# Patient Record
Sex: MALE | Race: WHITE | NOT HISPANIC OR LATINO | URBAN - METROPOLITAN AREA
[De-identification: names, ages, dates, MRNs, and addresses within clinical notes are randomized per-mention and may not be internally consistent; named-entity substitution may affect disease eponyms.]

---
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
## 2023-04-02 ENCOUNTER — INPATIENT (INPATIENT)
Facility: HOSPITAL | Age: 18
LOS: 1 days | Discharge: HOME | End: 2023-04-04
Attending: STUDENT IN AN ORGANIZED HEALTH CARE EDUCATION/TRAINING PROGRAM | Admitting: STUDENT IN AN ORGANIZED HEALTH CARE EDUCATION/TRAINING PROGRAM
Payer: COMMERCIAL

## 2023-04-02 VITALS
OXYGEN SATURATION: 100 % | TEMPERATURE: 98 F | DIASTOLIC BLOOD PRESSURE: 66 MMHG | SYSTOLIC BLOOD PRESSURE: 133 MMHG | RESPIRATION RATE: 18 BRPM | HEART RATE: 96 BPM

## 2023-04-02 LAB
FLUAV AG NPH QL: SIGNIFICANT CHANGE UP
FLUBV AG NPH QL: SIGNIFICANT CHANGE UP
RSV RNA NPH QL NAA+NON-PROBE: SIGNIFICANT CHANGE UP
SARS-COV-2 RNA SPEC QL NAA+PROBE: SIGNIFICANT CHANGE UP

## 2023-04-02 PROCEDURE — 99223 1ST HOSP IP/OBS HIGH 75: CPT

## 2023-04-02 RX ORDER — KETOROLAC TROMETHAMINE 30 MG/ML
15 SYRINGE (ML) INJECTION EVERY 6 HOURS
Refills: 0 | Status: DISCONTINUED | OUTPATIENT
Start: 2023-04-02 | End: 2023-04-03

## 2023-04-02 RX ORDER — OXYCODONE AND ACETAMINOPHEN 5; 325 MG/1; MG/1
1 TABLET ORAL EVERY 6 HOURS
Refills: 0 | Status: DISCONTINUED | OUTPATIENT
Start: 2023-04-02 | End: 2023-04-03

## 2023-04-02 RX ORDER — DIAZEPAM 5 MG
2 TABLET ORAL ONCE
Refills: 0 | Status: DISCONTINUED | OUTPATIENT
Start: 2023-04-02 | End: 2023-04-02

## 2023-04-02 RX ORDER — MORPHINE SULFATE 50 MG/1
4 CAPSULE, EXTENDED RELEASE ORAL ONCE
Refills: 0 | Status: DISCONTINUED | OUTPATIENT
Start: 2023-04-02 | End: 2023-04-02

## 2023-04-02 RX ORDER — KETOROLAC TROMETHAMINE 30 MG/ML
15 SYRINGE (ML) INJECTION ONCE
Refills: 0 | Status: DISCONTINUED | OUTPATIENT
Start: 2023-04-02 | End: 2023-04-02

## 2023-04-02 RX ADMIN — Medication 15 MILLIGRAM(S): at 21:47

## 2023-04-02 RX ADMIN — Medication 2 MILLIGRAM(S): at 14:46

## 2023-04-02 RX ADMIN — Medication 15 MILLIGRAM(S): at 16:32

## 2023-04-02 RX ADMIN — MORPHINE SULFATE 4 MILLIGRAM(S): 50 CAPSULE, EXTENDED RELEASE ORAL at 23:49

## 2023-04-02 RX ADMIN — Medication 15 MILLIGRAM(S): at 21:33

## 2023-04-02 RX ADMIN — OXYCODONE AND ACETAMINOPHEN 1 TABLET(S): 5; 325 TABLET ORAL at 22:21

## 2023-04-02 RX ADMIN — OXYCODONE AND ACETAMINOPHEN 1 TABLET(S): 5; 325 TABLET ORAL at 22:51

## 2023-04-02 NOTE — ED CDU PROVIDER INITIAL DAY NOTE - ATTENDING APP SHARED VISIT CONTRIBUTION OF CARE
I have personally performed a face to face medical and diagnostic evaluation of the patient. I have discussed with and reviewed the CATALINA's note and agree with the History, ROS, Physical Exam and MDM unless otherwise indicated. A brief summary of my personal evaluation and impression can be found below.    Dandre ROSA: Please see the HPI, ROS, PE and MDM as authored by me.

## 2023-04-02 NOTE — ED ADULT NURSE NOTE - OBJECTIVE STATEMENT
pt received spot 19A. pt A+Ox3, c/o left buttock pain that radiates down left leg x 3 days. states "satish been lifting heavy this month." respirations even and unlabored. medicated for pain as ordered. NAD noted. covid swab obtained. will monitor.

## 2023-04-02 NOTE — ED PROVIDER NOTE - OBJECTIVE STATEMENT
Dandre ROSA: 17-year-old male history of Crohn's disease ADHD, presents with a chief complaint of gluteal pain radiating down the left lower extremity over the last month, patient reports initially exacerbated experienced pain in the setting of lifting heavy about a month ago, had an atraumatic flareup of his pain over the last week, no recent falls or trauma, can move everything and feel everything, he reports he went to an outside hospital in [place] where he had a CT and duplex that were negative he has been taking muscle relaxants benzos Tylenol and Motrin without improvement, has not yet seen orthopedics, no IVDA, no fever, can move everything and feel everything, no urinary or bowel complaints, no saddle anesthesia, is able to walk reports has difficulty with doing so.  No rashes.  No other injuries no other complaints. His mother is a pediatrician with [employer], his father is accompanying the patient at bedside.

## 2023-04-02 NOTE — ED CDU PROVIDER INITIAL DAY NOTE - CLINICAL SUMMARY MEDICAL DECISION MAKING FREE TEXT BOX
Dandre ROSA: DDx his exam is grossly reassuring with a straight leg raise, he has no signs of acute cord compression or spinal pathology at this time, I suspect likely sciatica versus possible but less likely hamstring injury, discussed case with his pediatrician, patient not yet seen with an orthopedist, given his pain patient is willing to stay in the CDU for a lumbar MRI, will give pain control check swab discussed with CDU PA.

## 2023-04-02 NOTE — ED PROVIDER NOTE - CLINICAL SUMMARY MEDICAL DECISION MAKING FREE TEXT BOX
Dandre ROSA: DDx his exam is grossly reassuring with a straight leg raise, he has no signs of acute cord compression or spinal pathology at this time, I suspect likely sciatica versus possible but less likely hamstring injury, discussed case with his pediatrician, patient not yet seen with an orthopedist, given his pain patient is willing to stay in the CDU for a lumbar MRI, will give pain control check swab discussed with CDU PA. Dandre ROSA: DDx his exam is grossly reassuring with a straight leg raise, he has no signs of acute cord compression or spinal pathology at this time, I suspect likely sciatica versus possible but less likely hamstring injury, discussed case with his pediatrician Dr. Jaycee Sultana, patient not yet seen with an orthopedist, given his pain patient is willing to stay in the CDU for a lumbar MRI, will give pain control check swab discussed with CDU PA.

## 2023-04-02 NOTE — ED ADULT NURSE NOTE - CHIEF COMPLAINT QUOTE
c/o left buttock pain that radiates down the left leg onset 2 days ago , reports was seen at [facility] but could not perform MRI at that time, denies urinary or fecal incontinence. hx of Chrones

## 2023-04-02 NOTE — ED PROVIDER NOTE - PHYSICAL EXAMINATION
Dandre ROSA:  VITALS: Initial triage and subsequent vitals have been reviewed by me.  GEN APPEARANCE: WDWN, alert and cooperative, non-toxic appearing and in NAD  HEAD: Atraumatic, normocephalic   EYES: PERRLa, EOMI, vision grossly intact.   EARS: Gross hearing intact.   NOSE: No nasal discharge, no external evidence of epistaxis.   NECK: Supple  CV: RRR, S1S2, no c/r/m/g. No cyanosis. Extremities warm, well perfused. Cap refill <2 seconds. No bruits.   LUNGS: CTAB. No wheezing. No rales. No rhonchi. No diminished breath sounds.   ABDOMEN: Soft, NTND. No guarding or rebound. No masses.   MSK/EXT: No midline back pain, straight leg raise positive left lower extremity +2 pulses bilateral lower extremity no saddle anesthesia, good rectal tone, father at bedside. Spine appears normal, no spine point tenderness. No CVA ttp. Normal muscular development. Pelvis stable. No obvious joint or bony deformity, no peripheral edema.   NEURO: Alert, follows commands. Weight bearing normal. Speech normal. Sensation and motor normal x4 extremities.   SKIN: Normal color for race, warm, dry and intact. No evidence of rash.  PSYCH: Normal mood and affect. Dandre ROSA:  VITALS: Initial triage and subsequent vitals have been reviewed by me.  GEN APPEARANCE: WDWN, alert and cooperative, non-toxic appearing and in NAD  HEAD: Atraumatic, normocephalic   EYES: PERRLa, EOMI, vision grossly intact.   EARS: Gross hearing intact.   NOSE: No nasal discharge, no external evidence of epistaxis.   NECK: Supple  CV: RRR, S1S2, no c/r/m/g. No cyanosis. Extremities warm, well perfused. Cap refill <2 seconds. No bruits.   LUNGS: CTAB. No wheezing. No rales. No rhonchi. No diminished breath sounds.   ABDOMEN: Soft, NTND. No guarding or rebound. No masses.   MSK/EXT: No midline back pain, straight leg raise positive left lower extremity +2 pulses bilateral lower extremity no saddle anesthesia, good rectal tone, father at bedside. Spine appears normal, no spine point tenderness. No CVA ttp. Normal muscular development. Pelvis stable. No obvious joint or bony deformity, no peripheral edema. 5/5 LE b/l no bony ttp b/l  NEURO: Alert, follows commands. Weight bearing normal. Speech normal. Sensation and motor normal x4 extremities.   SKIN: Normal color for race, warm, dry and intact. No evidence of rash.  PSYCH: Normal mood and affect.

## 2023-04-02 NOTE — ED CDU PROVIDER INITIAL DAY NOTE - OBJECTIVE STATEMENT
Dandre ROSA: 17-year-old male history of Crohn's disease ADHD, presents with a chief complaint of gluteal pain radiating down the left lower extremity over the last month, patient reports initially exacerbated experienced pain in the setting of lifting heavy about a month ago, had an atraumatic flareup of his pain over the last week, no recent falls or trauma, can move everything and feel everything, he reports he went to an outside hospital in [place] where he had a CT and duplex that were negative he has been taking muscle relaxants benzos Tylenol and Motrin without improvement, has not yet seen orthopedics, no IVDA, no fever, can move everything and feel everything, no urinary or bowel complaints, no saddle anesthesia, is able to walk reports has difficulty with doing so.  No rashes.  No other injuries no other complaints. His mother is a pediatrician with [employer], his father is accompanying the patient at bedside.

## 2023-04-02 NOTE — ED ADULT TRIAGE NOTE - CHIEF COMPLAINT QUOTE
c/o left buttock pain that radiates down the left leg onset 2 days ago , reports was seen at [facility] but could not perform MRI at that time, denies urinary or fecal incontinence. hx of Chrones

## 2023-04-03 DIAGNOSIS — M51.26 OTHER INTERVERTEBRAL DISC DISPLACEMENT, LUMBAR REGION: ICD-10-CM

## 2023-04-03 DIAGNOSIS — M54.9 DORSALGIA, UNSPECIFIED: ICD-10-CM

## 2023-04-03 DIAGNOSIS — K50.90 CROHN'S DISEASE, UNSPECIFIED, WITHOUT COMPLICATIONS: ICD-10-CM

## 2023-04-03 DIAGNOSIS — Z98.890 OTHER SPECIFIED POSTPROCEDURAL STATES: Chronic | ICD-10-CM

## 2023-04-03 DIAGNOSIS — Z29.9 ENCOUNTER FOR PROPHYLACTIC MEASURES, UNSPECIFIED: ICD-10-CM

## 2023-04-03 LAB
ANION GAP SERPL CALC-SCNC: 12 MMOL/L — SIGNIFICANT CHANGE UP (ref 7–14)
BASOPHILS # BLD AUTO: 0.01 K/UL — SIGNIFICANT CHANGE UP (ref 0–0.2)
BASOPHILS NFR BLD AUTO: 0.2 % — SIGNIFICANT CHANGE UP (ref 0–2)
BUN SERPL-MCNC: 18 MG/DL — SIGNIFICANT CHANGE UP (ref 7–23)
CALCIUM SERPL-MCNC: 9.8 MG/DL — SIGNIFICANT CHANGE UP (ref 8.4–10.5)
CHLORIDE SERPL-SCNC: 103 MMOL/L — SIGNIFICANT CHANGE UP (ref 98–107)
CO2 SERPL-SCNC: 26 MMOL/L — SIGNIFICANT CHANGE UP (ref 22–31)
CREAT SERPL-MCNC: 0.94 MG/DL — SIGNIFICANT CHANGE UP (ref 0.5–1.3)
EGFR: 120 ML/MIN/1.73M2 — SIGNIFICANT CHANGE UP
EOSINOPHIL # BLD AUTO: 0 K/UL — SIGNIFICANT CHANGE UP (ref 0–0.5)
EOSINOPHIL NFR BLD AUTO: 0 % — SIGNIFICANT CHANGE UP (ref 0–6)
GLUCOSE SERPL-MCNC: 143 MG/DL — HIGH (ref 70–99)
HCT VFR BLD CALC: 49.2 % — SIGNIFICANT CHANGE UP (ref 39–50)
HGB BLD-MCNC: 15.9 G/DL — SIGNIFICANT CHANGE UP (ref 13–17)
IANC: 5.08 K/UL — SIGNIFICANT CHANGE UP (ref 1.8–7.4)
IMM GRANULOCYTES NFR BLD AUTO: 0.3 % — SIGNIFICANT CHANGE UP (ref 0–0.9)
LYMPHOCYTES # BLD AUTO: 0.86 K/UL — LOW (ref 1–3.3)
LYMPHOCYTES # BLD AUTO: 14.2 % — SIGNIFICANT CHANGE UP (ref 13–44)
MAGNESIUM SERPL-MCNC: 1.9 MG/DL — SIGNIFICANT CHANGE UP (ref 1.6–2.6)
MCHC RBC-ENTMCNC: 27.5 PG — SIGNIFICANT CHANGE UP (ref 27–34)
MCHC RBC-ENTMCNC: 32.3 GM/DL — SIGNIFICANT CHANGE UP (ref 32–36)
MCV RBC AUTO: 85.1 FL — SIGNIFICANT CHANGE UP (ref 80–100)
MONOCYTES # BLD AUTO: 0.07 K/UL — SIGNIFICANT CHANGE UP (ref 0–0.9)
MONOCYTES NFR BLD AUTO: 1.2 % — LOW (ref 2–14)
NEUTROPHILS # BLD AUTO: 5.08 K/UL — SIGNIFICANT CHANGE UP (ref 1.8–7.4)
NEUTROPHILS NFR BLD AUTO: 84.1 % — HIGH (ref 43–77)
NRBC # BLD: 0 /100 WBCS — SIGNIFICANT CHANGE UP (ref 0–0)
NRBC # FLD: 0 K/UL — SIGNIFICANT CHANGE UP (ref 0–0)
PHOSPHATE SERPL-MCNC: 2.9 MG/DL — SIGNIFICANT CHANGE UP (ref 2.5–4.5)
PLATELET # BLD AUTO: 184 K/UL — SIGNIFICANT CHANGE UP (ref 150–400)
POTASSIUM SERPL-MCNC: 4.1 MMOL/L — SIGNIFICANT CHANGE UP (ref 3.5–5.3)
POTASSIUM SERPL-SCNC: 4.1 MMOL/L — SIGNIFICANT CHANGE UP (ref 3.5–5.3)
RBC # BLD: 5.78 M/UL — SIGNIFICANT CHANGE UP (ref 4.2–5.8)
RBC # FLD: 12.5 % — SIGNIFICANT CHANGE UP (ref 10.3–14.5)
SODIUM SERPL-SCNC: 141 MMOL/L — SIGNIFICANT CHANGE UP (ref 135–145)
WBC # BLD: 6.04 K/UL — SIGNIFICANT CHANGE UP (ref 3.8–10.5)
WBC # FLD AUTO: 6.04 K/UL — SIGNIFICANT CHANGE UP (ref 3.8–10.5)

## 2023-04-03 PROCEDURE — 99223 1ST HOSP IP/OBS HIGH 75: CPT

## 2023-04-03 PROCEDURE — 73502 X-RAY EXAM HIP UNI 2-3 VIEWS: CPT | Mod: 26,LT

## 2023-04-03 PROCEDURE — 99221 1ST HOSP IP/OBS SF/LOW 40: CPT

## 2023-04-03 PROCEDURE — 99233 SBSQ HOSP IP/OBS HIGH 50: CPT

## 2023-04-03 PROCEDURE — 72148 MRI LUMBAR SPINE W/O DYE: CPT | Mod: 26,MA

## 2023-04-03 RX ORDER — LANOLIN ALCOHOL/MO/W.PET/CERES
6 CREAM (GRAM) TOPICAL ONCE
Refills: 0 | Status: COMPLETED | OUTPATIENT
Start: 2023-04-03 | End: 2023-04-03

## 2023-04-03 RX ORDER — LANOLIN ALCOHOL/MO/W.PET/CERES
3 CREAM (GRAM) TOPICAL AT BEDTIME
Refills: 0 | Status: DISCONTINUED | OUTPATIENT
Start: 2023-04-03 | End: 2023-04-04

## 2023-04-03 RX ORDER — CYCLOBENZAPRINE HYDROCHLORIDE 10 MG/1
10 TABLET, FILM COATED ORAL THREE TIMES A DAY
Refills: 0 | Status: DISCONTINUED | OUTPATIENT
Start: 2023-04-03 | End: 2023-04-04

## 2023-04-03 RX ORDER — SODIUM CHLORIDE 9 MG/ML
1000 INJECTION, SOLUTION INTRAVENOUS
Refills: 0 | Status: DISCONTINUED | OUTPATIENT
Start: 2023-04-03 | End: 2023-04-03

## 2023-04-03 RX ORDER — MORPHINE SULFATE 50 MG/1
4 CAPSULE, EXTENDED RELEASE ORAL EVERY 6 HOURS
Refills: 0 | Status: DISCONTINUED | OUTPATIENT
Start: 2023-04-03 | End: 2023-04-04

## 2023-04-03 RX ORDER — MORPHINE SULFATE 50 MG/1
2 CAPSULE, EXTENDED RELEASE ORAL EVERY 4 HOURS
Refills: 0 | Status: DISCONTINUED | OUTPATIENT
Start: 2023-04-03 | End: 2023-04-03

## 2023-04-03 RX ORDER — LANOLIN ALCOHOL/MO/W.PET/CERES
6 CREAM (GRAM) TOPICAL AT BEDTIME
Refills: 0 | Status: DISCONTINUED | OUTPATIENT
Start: 2023-04-03 | End: 2023-04-03

## 2023-04-03 RX ORDER — MORPHINE SULFATE 50 MG/1
4 CAPSULE, EXTENDED RELEASE ORAL ONCE
Refills: 0 | Status: DISCONTINUED | OUTPATIENT
Start: 2023-04-03 | End: 2023-04-03

## 2023-04-03 RX ORDER — IBUPROFEN 200 MG
600 TABLET ORAL EVERY 8 HOURS
Refills: 0 | Status: DISCONTINUED | OUTPATIENT
Start: 2023-04-03 | End: 2023-04-04

## 2023-04-03 RX ORDER — ACETAMINOPHEN 500 MG
650 TABLET ORAL EVERY 6 HOURS
Refills: 0 | Status: DISCONTINUED | OUTPATIENT
Start: 2023-04-03 | End: 2023-04-04

## 2023-04-03 RX ORDER — ONDANSETRON 8 MG/1
4 TABLET, FILM COATED ORAL EVERY 8 HOURS
Refills: 0 | Status: DISCONTINUED | OUTPATIENT
Start: 2023-04-03 | End: 2023-04-04

## 2023-04-03 RX ORDER — MORPHINE SULFATE 50 MG/1
4 CAPSULE, EXTENDED RELEASE ORAL EVERY 4 HOURS
Refills: 0 | Status: DISCONTINUED | OUTPATIENT
Start: 2023-04-03 | End: 2023-04-03

## 2023-04-03 RX ORDER — GABAPENTIN 400 MG/1
300 CAPSULE ORAL AT BEDTIME
Refills: 0 | Status: DISCONTINUED | OUTPATIENT
Start: 2023-04-03 | End: 2023-04-04

## 2023-04-03 RX ORDER — OXYCODONE HYDROCHLORIDE 5 MG/1
10 TABLET ORAL EVERY 6 HOURS
Refills: 0 | Status: DISCONTINUED | OUTPATIENT
Start: 2023-04-03 | End: 2023-04-04

## 2023-04-03 RX ORDER — DEXAMETHASONE 0.5 MG/5ML
10 ELIXIR ORAL ONCE
Refills: 0 | Status: COMPLETED | OUTPATIENT
Start: 2023-04-03 | End: 2023-04-03

## 2023-04-03 RX ORDER — DEXAMETHASONE 0.5 MG/5ML
4 ELIXIR ORAL EVERY 6 HOURS
Refills: 0 | Status: DISCONTINUED | OUTPATIENT
Start: 2023-04-03 | End: 2023-04-04

## 2023-04-03 RX ADMIN — MORPHINE SULFATE 2 MILLIGRAM(S): 50 CAPSULE, EXTENDED RELEASE ORAL at 16:50

## 2023-04-03 RX ADMIN — MORPHINE SULFATE 2 MILLIGRAM(S): 50 CAPSULE, EXTENDED RELEASE ORAL at 02:43

## 2023-04-03 RX ADMIN — MORPHINE SULFATE 4 MILLIGRAM(S): 50 CAPSULE, EXTENDED RELEASE ORAL at 00:04

## 2023-04-03 RX ADMIN — OXYCODONE HYDROCHLORIDE 10 MILLIGRAM(S): 5 TABLET ORAL at 19:58

## 2023-04-03 RX ADMIN — MORPHINE SULFATE 2 MILLIGRAM(S): 50 CAPSULE, EXTENDED RELEASE ORAL at 18:15

## 2023-04-03 RX ADMIN — GABAPENTIN 300 MILLIGRAM(S): 400 CAPSULE ORAL at 21:52

## 2023-04-03 RX ADMIN — MORPHINE SULFATE 2 MILLIGRAM(S): 50 CAPSULE, EXTENDED RELEASE ORAL at 09:54

## 2023-04-03 RX ADMIN — Medication 102 MILLIGRAM(S): at 10:40

## 2023-04-03 RX ADMIN — Medication 6 MILLIGRAM(S): at 04:09

## 2023-04-03 RX ADMIN — MORPHINE SULFATE 2 MILLIGRAM(S): 50 CAPSULE, EXTENDED RELEASE ORAL at 02:58

## 2023-04-03 RX ADMIN — MORPHINE SULFATE 4 MILLIGRAM(S): 50 CAPSULE, EXTENDED RELEASE ORAL at 23:04

## 2023-04-03 RX ADMIN — MORPHINE SULFATE 2 MILLIGRAM(S): 50 CAPSULE, EXTENDED RELEASE ORAL at 10:15

## 2023-04-03 RX ADMIN — OXYCODONE HYDROCHLORIDE 10 MILLIGRAM(S): 5 TABLET ORAL at 21:41

## 2023-04-03 RX ADMIN — SODIUM CHLORIDE 100 MILLILITER(S): 9 INJECTION, SOLUTION INTRAVENOUS at 10:40

## 2023-04-03 RX ADMIN — MORPHINE SULFATE 2 MILLIGRAM(S): 50 CAPSULE, EXTENDED RELEASE ORAL at 17:57

## 2023-04-03 RX ADMIN — Medication 4 MILLIGRAM(S): at 16:15

## 2023-04-03 RX ADMIN — MORPHINE SULFATE 2 MILLIGRAM(S): 50 CAPSULE, EXTENDED RELEASE ORAL at 16:24

## 2023-04-03 RX ADMIN — MORPHINE SULFATE 4 MILLIGRAM(S): 50 CAPSULE, EXTENDED RELEASE ORAL at 22:46

## 2023-04-03 RX ADMIN — Medication 4 MILLIGRAM(S): at 21:52

## 2023-04-03 NOTE — H&P ADULT - NSHPLABSRESULTS_GEN_ALL_CORE
Labs are ordered.       MR lumbar spine as reviewed by the radiologist: Mild disc desiccation L3-4 and L5-S1. Multiple Schmorl's   nodes. Small left-sided disc herniation L5-S1 with extension into the   left neural  foramen and left lateral recess with mass effect on the left   L5 and S1 nerve roots

## 2023-04-03 NOTE — PATIENT PROFILE ADULT - FALL HARM RISK - HARM RISK INTERVENTIONS

## 2023-04-03 NOTE — ED CDU PROVIDER SUBSEQUENT DAY NOTE - CLINICAL SUMMARY MEDICAL DECISION MAKING FREE TEXT BOX
17-year-old male history of Crohn's disease ADHD, presents with a chief complaint of gluteal pain radiating down the left lower extremity over the last month, patient reports initially exacerbated experienced pain in the setting of lifting heavy about a month ago      Pending MRI results

## 2023-04-03 NOTE — H&P ADULT - NSHPREVIEWOFSYSTEMS_GEN_ALL_CORE
REVIEW OF SYSTEMS:    CONSTITUTIONAL: No weakness, fevers or chills  EYES/ENT: No visual changes;  No vertigo or throat pain   NECK: No pain or stiffness  RESPIRATORY: No cough, wheezing, hemoptysis; No shortness of breath  CARDIOVASCULAR: No chest pain or palpitations  GASTROINTESTINAL: No abdominal or epigastric pain. No nausea, vomiting, or hematemesis; No diarrhea or constipation. No melena or hematochezia.  GENITOURINARY: No dysuria, frequency or hematuria  NEUROLOGICAL: +back pain, L leg pain   MUSCULOSKELETAL: No joint pain, no muscle ache   SKIN: No itching, burning, rashes, or lesions   All other review of systems is negative unless indicated above.

## 2023-04-03 NOTE — H&P ADULT - PROBLEM SELECTOR PLAN 1
Patient with acute and worsening lumbar back pain, radiating down his L leg   -MR lumbar spine showed small left-sided disc herniation L5-S1 with extension into the   left neural  foramen and left lateral recess with mass effect on the left   L5 and S1 nerve roots  -Neurosurgery recs appreciated. C/w IV decadron  -C/w morphine 4 mg IV PRN, oxycodone 10 mg oral PRN, and ibuprofen PRN   -C/w gabapentin 300 mg at bedtime and flexeril q8hrs  -F/u with Hip xray  -PT eval

## 2023-04-03 NOTE — H&P ADULT - ASSESSMENT
17-year-old male history of Crohn's disease presents to the ED with back pain, found to have L5 and S1 disc herniation.

## 2023-04-03 NOTE — ED CDU PROVIDER DISPOSITION NOTE - CLINICAL COURSE
17-year-old male history of Crohn's disease ADHD, presents with a chief complaint of gluteal pain radiating down the left lower extremity over the last month, patient reports initially exacerbated experienced pain in the setting of lifting heavy about a month ago.  In ED, pt requiring further pain control and sent to CDU for MR L spine. MR "IMPRESSION: Mild disc desiccation L3-4 and L5-S1. Multiple Schmorl's nodes. Small left-sided disc herniation L5-S1 with extension into the left neural  foramen and left lateral recess with mass effect on the left L5 and S1 nerve roots." Neurosx consulted, advised steroids and pain control, no surgical intervention needed at this time. Pt requiring admission for further pain control.

## 2023-04-03 NOTE — CONSULT NOTE ADULT - PROBLEM SELECTOR RECOMMENDATION 9
1. decadron 10mg IV x1 then 4mg Q6H  2. PT consult  3. pain meds PRN  4. case to be d/w Attending 1. decadron 10mg IV x1 then 4mg Q6H  2. PT consult  3. pain meds PRN, flexeril 10mg PO Q8h  4. case to be d/w Attending

## 2023-04-03 NOTE — ED CDU PROVIDER DISPOSITION NOTE - ATTENDING CONTRIBUTION TO CARE
: Shay Escalante DO:  patient seen and evaluated with the PA.  I was present for key portions of the History & Physical, and I agree with the Impression & Plan. 18 yo m pmh Crohn's disease ADHD,, pw left bp radiating to LE. 1 month ago patient developed symptoms while picking something up and standing up.  Since then has had pain however pain got worse in the last week.  Went to ER in [place] where patient lives and goes to college, nonrecurrent that time.  I reviewed this morning, L5-S1 disc herniation with mass effect.  No signs of cauda equina or cord compression.  Of note patient's mother is a pediatrician, reports has been treating patient with steroids, benzos, mild narcotics without any relief.  Patient reports continued pain this morning.  Denies new symptoms.  Denies bowel/bladder incontinence.  Results reviewed with mother and patient.  They provided teach back.  Given pain and findings neurosurgery consulted.  No intervention at this time per neurosurgery.  Given patient has persistent pain, admitted for pain control and PT

## 2023-04-03 NOTE — ED CDU PROVIDER SUBSEQUENT DAY NOTE - ATTENDING APP SHARED VISIT CONTRIBUTION OF CARE
Shay Escalante DO:  patient seen and evaluated with the PA.  I was present for key portions of the History & Physical, and I agree with the Impression & Plan. 16 yo m pmh Crohn's disease ADHD,, pw left bp radiating to LE. 1 month ago patient developed symptoms while picking something up and standing up.  Since then has had pain however pain got worse in the last week.  Went to ER in [place] where patient lives and goes to college, nonrecurrent that time.  I reviewed this morning, L5-S1 disc herniation with mass effect.  No signs of cauda equina or cord compression.  Of note patient's mother is a pediatrician, reports has been treating patient with steroids, benzos, mild narcotics without any relief.  Patient reports continued pain this morning.  Denies new symptoms.  Denies bowel/bladder incontinence.  Results reviewed with mother and patient.  They provided teach back.  Given pain and findings neurosurgery consulted.  Pending recs.

## 2023-04-03 NOTE — H&P ADULT - PROBLEM SELECTOR PLAN 2
MR lumbar spine showed small left-sided disc herniation L5-S1 with extension into the   left neural  foramen and left lateral recess with mass effect on the left   L5 and S1 nerve roots  -neurosurgery recs appreciated   -pain management as above

## 2023-04-03 NOTE — H&P ADULT - NSHPPHYSICALEXAM_GEN_ALL_CORE
Vital Signs Last 24 Hrs  T(C): 36.7 (03 Apr 2023 13:40), Max: 36.7 (03 Apr 2023 09:45)  T(F): 98 (03 Apr 2023 13:40), Max: 98 (03 Apr 2023 09:45)  HR: 65 (03 Apr 2023 16:20) (54 - 95)  BP: 119/85 (03 Apr 2023 16:20) (113/62 - 127/65)  BP(mean): --  RR: 16 (03 Apr 2023 13:40) (16 - 18)  SpO2: 100% (03 Apr 2023 13:40) (98% - 100%)    Parameters below as of 03 Apr 2023 13:40  Patient On (Oxygen Delivery Method): room air    GENERAL: NAD, well-developed  HEENT:  Atraumatic, Normocephalic, Conjunctiva and sclera clear, oral mucosa moist, clear w/o any exudate   NECK: Supple, No JVD  CHEST/LUNG: Clear to auscultation bilaterally; No wheeze  HEART: Regular rate and rhythm; No murmurs, rubs, or gallops  ABDOMEN: Soft, Nontender, Nondistended; Bowel sounds present  EXTREMITIES:  2+ Peripheral Pulses, No clubbing, cyanosis, or edema  PSYCH: AAOx3, normal affect   to palpation over L lumbar area.   NEUROLOGY: non-focal, moving all extremities   SKIN: No rashes or lesions

## 2023-04-03 NOTE — ED CDU PROVIDER SUBSEQUENT DAY NOTE - HISTORY
17-year-old male history of Crohn's disease ADHD, presents with a chief complaint of gluteal pain radiating down the left lower extremity over the last month, patient reports initially exacerbated experienced pain in the setting of lifting heavy about a month ago.    Requiring IV analgesia overnight. Pending MRI results

## 2023-04-03 NOTE — CONSULT NOTE ADULT - SUBJECTIVE AND OBJECTIVE BOX
· HPI : 17-year-old male history of Crohn's disease ADHD, presents with a chief complaint of gluteal pain radiating down the left lower extremity over the last month, patient reports initially exacerbated experienced pain in the setting of lifting heavy about a month ago, had an atraumatic flareup of his pain over the last week, no recent falls or trauma, can move everything and feel everything, he reports he went to an outside hospital in [place] where he had a CT and duplex that were negative he has been taking muscle relaxants benzos Tylenol and Motrin without improvement, has not yet seen orthopedics, no IVDA, no fever, can move everything and feel everything, no urinary or bowel complaints, no saddle anesthesia, is able to walk reports has difficulty with doing so.  No rashes.  No other injuries no other complaints. His mother is a pediatrician with [employer], his father is accompanying the patient at bedside.    MRI of the lumbar spine shows : Mild disc desiccation L3-4 and L5-S1. Multiple Schmorl's nodes. Small left-sided disc herniation L5-S1 with extension into the left neural  foramen and left lateral recess with mass effect on the left   L5 and S1 nerve roots.    PAST MEDICAL & SURGICAL HISTORY:  Crohn disease    Allergies  No Known Allergies    MEDICATIONS  (STANDING):    MEDICATIONS  (PRN):  ketorolac   Injectable 15 milliGRAM(s) IntraMuscular every 6 hours PRN Moderate Pain (4 - 6)  morphine  - Injectable 2 milliGRAM(s) IV Push every 4 hours PRN Severe Pain (7 - 10)  oxycodone    5 mG/acetaminophen 325 mG 1 Tablet(s) Oral every 6 hours PRN Severe Pain (7 - 10)    Vital Signs Last 24 Hrs  T(C): 36.4 (03 Apr 2023 05:33), Max: 36.8 (02 Apr 2023 12:43)  T(F): 97.6 (03 Apr 2023 05:33), Max: 98.3 (02 Apr 2023 12:43)  HR: 54 (03 Apr 2023 05:33) (54 - 96)  BP: 113/62 (03 Apr 2023 05:33) (113/62 - 133/66)  RR: 18 (03 Apr 2023 05:33) (18 - 18)  SpO2: 98% (03 Apr 2023 05:33) (98% - 100%)    Parameters below as of 03 Apr 2023 05:33  Patient On (Oxygen Delivery Method): room air    PE:  AA&0 x 3, CN 2-12 grossly intact, speech clear, follows commands, PERRL  Motor:  Sensory:  SLR:    LABS:                     · HPI : 17-year-old male history of Crohn's disease ADHD, presents with a chief complaint of gluteal pain radiating down the left lower extremity over the last month, patient reports initially exacerbated experienced pain in the setting of lifting heavy about a month ago, had an atraumatic flareup of his pain over the last week, no recent falls or trauma, can move everything and feel everything, he reports he went to an outside hospital in [place] where he had a CT and duplex that were negative he has been taking muscle relaxants benzos Tylenol and Motrin without improvement, has not yet seen orthopedics, no IVDA, no fever, can move everything and feel everything, no urinary or bowel complaints, no saddle anesthesia, is able to walk reports has difficulty with doing so.  No rashes.  No other injuries no other complaints. His mother is a pediatrician with [employer], his father is accompanying the patient at bedside.    MRI of the lumbar spine shows : Mild disc desiccation L3-4 and L5-S1. Multiple Schmorl's nodes. Small left-sided disc herniation L5-S1 with extension into the left neural  foramen and left lateral recess with mass effect on the left   L5 and S1 nerve roots.    PAST MEDICAL & SURGICAL HISTORY:  Crohn disease    Allergies  No Known Allergies    MEDICATIONS  (STANDING):    MEDICATIONS  (PRN):  ketorolac   Injectable 15 milliGRAM(s) IntraMuscular every 6 hours PRN Moderate Pain (4 - 6)  morphine  - Injectable 2 milliGRAM(s) IV Push every 4 hours PRN Severe Pain (7 - 10)  oxycodone    5 mG/acetaminophen 325 mG 1 Tablet(s) Oral every 6 hours PRN Severe Pain (7 - 10)    Vital Signs Last 24 Hrs  T(C): 36.4 (03 Apr 2023 05:33), Max: 36.8 (02 Apr 2023 12:43)  T(F): 97.6 (03 Apr 2023 05:33), Max: 98.3 (02 Apr 2023 12:43)  HR: 54 (03 Apr 2023 05:33) (54 - 96)  BP: 113/62 (03 Apr 2023 05:33) (113/62 - 133/66)  RR: 18 (03 Apr 2023 05:33) (18 - 18)  SpO2: 98% (03 Apr 2023 05:33) (98% - 100%)    Parameters below as of 03 Apr 2023 05:33  Patient On (Oxygen Delivery Method): room air    PE:  AA&0 x 3, CN 2-12 grossly intact, speech clear, follows commands, PERRL  Motor: strength 5/5  Sensory: SILT  SLR: negative B/L    LABS:

## 2023-04-03 NOTE — H&P ADULT - HISTORY OF PRESENT ILLNESS
17-year-old male history of Crohn's disease presents to the ED with back pain. The pain is localized on the left side, radiating down his left leg. It worsens with movement and improves with IV morphine. It started on Wednesday. No triggering event as per the patient. He denies any fall, trauma, or car accidents. He reports that he has been lifting heavy and working out 2-3 times a day for the last one month. He denies any recent fever, chills, bowel or urinary incontinence. He went to an OSH on Wednesday and underwent Xray and US which were reportedly normal. He was discharged with percocet and flexeril which did not improve his pain.   In the ED, his vitals were within acceptable range. Labs were ordered.

## 2023-04-03 NOTE — ED ADULT NURSE REASSESSMENT NOTE - NS ED NURSE REASSESS COMMENT FT1
report given to shirley khanna. patient stable. awaiting transport. Will continue to monitor
patient aaox4. ambulatory w/ steady gait. came in with lower back pain after working out and lifting heavy. patient reports he is comfortable at this time after pain med administration. reports pain level  3/10. denies numbness or tingling. respirations even and unlabored on room ai. made comfortable. updated on plan of care. Will continue to monitor

## 2023-04-03 NOTE — ED CDU PROVIDER SUBSEQUENT DAY NOTE - MUSCULOSKELETAL, MLM
Spine appears normal, range of motion is not limited, no muscle or joint tenderness
Fractures involving multiple body regions

## 2023-04-03 NOTE — PATIENT PROFILE ADULT - STATED REASON FOR ADMISSION
back pain radiating to left hamstring and ankle  Pain of back and lower extremity acute back pain (left sided disc herniation L5-S1)

## 2023-04-04 ENCOUNTER — TRANSCRIPTION ENCOUNTER (OUTPATIENT)
Age: 18
End: 2023-04-04

## 2023-04-04 VITALS
OXYGEN SATURATION: 99 % | WEIGHT: 132.28 LBS | DIASTOLIC BLOOD PRESSURE: 62 MMHG | HEART RATE: 68 BPM | TEMPERATURE: 98 F | SYSTOLIC BLOOD PRESSURE: 120 MMHG | RESPIRATION RATE: 18 BRPM | HEIGHT: 67 IN

## 2023-04-04 LAB
MRSA PCR RESULT.: SIGNIFICANT CHANGE UP
S AUREUS DNA NOSE QL NAA+PROBE: SIGNIFICANT CHANGE UP

## 2023-04-04 PROCEDURE — 99221 1ST HOSP IP/OBS SF/LOW 40: CPT

## 2023-04-04 PROCEDURE — 99239 HOSP IP/OBS DSCHRG MGMT >30: CPT

## 2023-04-04 RX ORDER — OXYCODONE HYDROCHLORIDE 5 MG/1
10 TABLET ORAL ONCE
Refills: 0 | Status: DISCONTINUED | OUTPATIENT
Start: 2023-04-04 | End: 2023-04-04

## 2023-04-04 RX ORDER — LANOLIN ALCOHOL/MO/W.PET/CERES
6 CREAM (GRAM) TOPICAL ONCE
Refills: 0 | Status: COMPLETED | OUTPATIENT
Start: 2023-04-04 | End: 2023-04-04

## 2023-04-04 RX ORDER — OXYCODONE HYDROCHLORIDE 5 MG/1
10 TABLET ORAL EVERY 6 HOURS
Refills: 0 | Status: DISCONTINUED | OUTPATIENT
Start: 2023-04-04 | End: 2023-04-04

## 2023-04-04 RX ORDER — CHLORHEXIDINE GLUCONATE 213 G/1000ML
1 SOLUTION TOPICAL DAILY
Refills: 0 | Status: DISCONTINUED | OUTPATIENT
Start: 2023-04-04 | End: 2023-04-04

## 2023-04-04 RX ORDER — GABAPENTIN 400 MG/1
3 CAPSULE ORAL
Qty: 42 | Refills: 0
Start: 2023-04-04 | End: 2023-04-17

## 2023-04-04 RX ORDER — OXYCODONE HYDROCHLORIDE 5 MG/1
1 TABLET ORAL
Qty: 30 | Refills: 0
Start: 2023-04-04 | End: 2023-04-08

## 2023-04-04 RX ORDER — MELOXICAM 15 MG/1
1 TABLET ORAL
Qty: 28 | Refills: 0
Start: 2023-04-04 | End: 2023-04-17

## 2023-04-04 RX ADMIN — CYCLOBENZAPRINE HYDROCHLORIDE 10 MILLIGRAM(S): 10 TABLET, FILM COATED ORAL at 02:39

## 2023-04-04 RX ADMIN — Medication 3 MILLIGRAM(S): at 00:03

## 2023-04-04 RX ADMIN — OXYCODONE HYDROCHLORIDE 10 MILLIGRAM(S): 5 TABLET ORAL at 10:57

## 2023-04-04 RX ADMIN — Medication 4 MILLIGRAM(S): at 04:49

## 2023-04-04 RX ADMIN — OXYCODONE HYDROCHLORIDE 10 MILLIGRAM(S): 5 TABLET ORAL at 09:57

## 2023-04-04 RX ADMIN — Medication 6 MILLIGRAM(S): at 02:39

## 2023-04-04 RX ADMIN — OXYCODONE HYDROCHLORIDE 10 MILLIGRAM(S): 5 TABLET ORAL at 05:50

## 2023-04-04 RX ADMIN — Medication 4 MILLIGRAM(S): at 09:56

## 2023-04-04 RX ADMIN — OXYCODONE HYDROCHLORIDE 10 MILLIGRAM(S): 5 TABLET ORAL at 04:50

## 2023-04-04 NOTE — PROGRESS NOTE ADULT - SUBJECTIVE AND OBJECTIVE BOX
Patient is a 17y old  Male who presents with a chief complaint of back pain (04 Apr 2023 11:04)      SUBJECTIVE / OVERNIGHT EVENTS: No acute events overnight. Pt still having some pain. Father at bedside. Pt and family opting for discharge today with oral pain meds and out patient PT. Will f/u with Neurosurgery out patient     ADDITIONAL REVIEW OF SYSTEMS:    MEDICATIONS  (STANDING):  chlorhexidine 2% Cloths 1 Application(s) Topical daily  dexAMETHasone  Injectable 4 milliGRAM(s) IV Push every 6 hours  gabapentin 300 milliGRAM(s) Oral at bedtime    MEDICATIONS  (PRN):  acetaminophen     Tablet .. 650 milliGRAM(s) Oral every 6 hours PRN Temp greater or equal to 38C (100.4F), Mild Pain (1 - 3)  aluminum hydroxide/magnesium hydroxide/simethicone Suspension 30 milliLiter(s) Oral every 4 hours PRN Dyspepsia  cyclobenzaprine 10 milliGRAM(s) Oral three times a day PRN Muscle Spasm  ibuprofen  Tablet. 600 milliGRAM(s) Oral every 8 hours PRN Mild Pain (1 - 3)  melatonin 3 milliGRAM(s) Oral at bedtime PRN Insomnia  morphine  - Injectable 4 milliGRAM(s) IV Push every 6 hours PRN Severe Pain (7 - 10)  ondansetron Injectable 4 milliGRAM(s) IV Push every 8 hours PRN Nausea and/or Vomiting  oxyCODONE    IR 10 milliGRAM(s) Oral every 6 hours PRN Moderate-Severe pain      CAPILLARY BLOOD GLUCOSE        I&O's Summary      PHYSICAL EXAM:  Vital Signs Last 24 Hrs  T(C): 36.4 (04 Apr 2023 08:30), Max: 36.8 (03 Apr 2023 21:38)  T(F): 97.5 (04 Apr 2023 08:30), Max: 98.2 (03 Apr 2023 21:38)  HR: 68 (04 Apr 2023 11:50) (60 - 73)  BP: 116/64 (04 Apr 2023 08:30) (115/69 - 126/59)  BP(mean): --  RR: 18 (04 Apr 2023 08:30) (16 - 18)  SpO2: 99% (04 Apr 2023 11:50) (97% - 100%)    Parameters below as of 04 Apr 2023 11:50  Patient On (Oxygen Delivery Method): room air      CONSTITUTIONAL: NAD  EYES: PERRLA; conjunctiva and sclera clear  ENMT: Moist oral mucosa, no pharyngeal injection or exudates; normal dentition  NECK: Supple, no palpable masses; no thyromegaly  RESPIRATORY: Normal respiratory effort; lungs are clear to auscultation bilaterally  CARDIOVASCULAR: Regular rate and rhythm, normal S1 and S2, no murmur/rub/gallop; No lower extremity edema; Peripheral pulses are 2+ bilaterally  ABDOMEN: Nontender to palpation, normoactive bowel sounds, no rebound/guarding; No hepatosplenomegaly  MUSCULOSKELETAL:  Normal gait; no clubbing or cyanosis of digits; no joint swelling or tenderness to palpation  PSYCH: A+O to person, place, and time; affect appropriate  NEUROLOGY: CN 2-12 are intact and symmetric; no gross sensory deficits   SKIN: No rashes; no palpable lesions    LABS:                        15.9   6.04  )-----------( 184      ( 03 Apr 2023 18:42 )             49.2     04-03    141  |  103  |  18  ----------------------------<  143<H>  4.1   |  26  |  0.94    Ca    9.8      03 Apr 2023 18:42  Phos  2.9     04-03  Mg     1.90     04-03                  RADIOLOGY & ADDITIONAL TESTS:  Results Reviewed:   Imaging Personally Reviewed:  Electrocardiogram Personally Reviewed:    COORDINATION OF CARE:  Care Discussed with Consultants/Other Providers [Y/N]:  Prior or Outpatient Records Reviewed [Y/N]:  
Patient seen and examined. In brief, 17-yo M w/ Crohn's disease who p/w 6-day history of excruciating pain from L buttock radiating down in to the L ankle. Came to the ED due to worsening pain. Work-up revealed L L5-S1 disc herniation and NUS was consulted in this setting. No foot weakness, bowel/bladder incontinence.    Steady tandem gait, 5/5 BLE. Slight difficulty with standing on the toes and heels but still able to maintain it without assistance.    MRI shows L L5-S1 paracentral disc herniation coming in contact with L S1 DRG.    Discussed with patient and family that patient likely has symptomatic L S1 radiculopathy. Fortunately no obvious neuro deficit. Given young age and lack of deficit, would recommend conservative management.    I spent 30 min at bedside evaluating the patient and coordinating his care.

## 2023-04-04 NOTE — DISCHARGE NOTE NURSING/CASE MANAGEMENT/SOCIAL WORK - PATIENT PORTAL LINK FT
You can access the FollowMyHealth Patient Portal offered by Health system by registering at the following website: [email or web address]. By joining [employer]’s FollowMyHealth portal, you will also be able to view your health information using other applications (apps) compatible with our system.

## 2023-04-04 NOTE — PHYSICAL THERAPY INITIAL EVALUATION ADULT - GAIT DEVIATIONS NOTED, PT EVAL
+Antalgic/decreased argelia/decreased step length/decreased stride length/decreased swing-to-stance ratio

## 2023-04-04 NOTE — CHART NOTE - NSCHARTNOTEFT_GEN_A_CORE
NSx f/u    Patient seen and examined with neurosurgical attending Dr. Ha, no acute neurosurgical intervention, recommend neurontin 900mg PO QHS, oxycodone 5mg PO Q4-6 hours PRN, meloxicam 7.5mg PO BID, Medrol dose pack up discharge, and a prescription for outpatient Physical therapy w/ traction.  Patient may f/u with Dr. Ha in 2 weeks from discharge or sooner if symptoms worsen, call tomorrow to schedule an appointment. reconsult prn.

## 2023-04-04 NOTE — DISCHARGE NOTE PROVIDER - NSDCCPCAREPLAN_GEN_ALL_CORE_FT
PRINCIPAL DISCHARGE DIAGNOSIS  Diagnosis: Pain of back and lower extremity  Assessment and Plan of Treatment: You had an MRI of your lumbar spine showing disck herniation L5-s!. You were evaluated by the neurosurgical team and no surgical intervention is planned at this time. Please continue pain medication and Decadron as prescribed; out patient physical therapy. Please follow up with neurosurgeon Dr. Ha     PRINCIPAL DISCHARGE DIAGNOSIS  Diagnosis: Pain of back and lower extremity  Assessment and Plan of Treatment: You had an MRI of your lumbar spine showing disck herniation L5-S1. You were evaluated by the neurosurgical team and no surgical intervention is planned at this time. Please continue pain medication and Decadron as prescribed; outpatient physical therapy. Please follow up with neurosurgeon Dr. Ha within 2 weeks for further management.

## 2023-04-04 NOTE — DISCHARGE NOTE PROVIDER - NSDCMRMEDTOKEN_GEN_ALL_CORE_FT
Medrol Dosepak 4 mg oral tablet: orally once a day  meloxicam 7.5 mg oral tablet: 1 tab(s) orally 2 times a day  Neurontin 300 mg oral capsule: 3 cap(s) orally once a day (at bedtime)  Outpatient PT 2-3x weekly: 2-3x weekly  oxyCODONE 5 mg oral tablet: 1 tab(s) orally every 4 hours as needed for  severe pain MDD: 6 tabs

## 2023-04-04 NOTE — DISCHARGE NOTE NURSING/CASE MANAGEMENT/SOCIAL WORK - NSDCPEFALRISK_GEN_ALL_CORE
For information on Fall & Injury Prevention, visit: [email or web address] OR  [email or web address] OR  https://www.cdc.gov/steadi/patient.html

## 2023-04-04 NOTE — PHYSICAL THERAPY INITIAL EVALUATION ADULT - ADDITIONAL COMMENTS
Pt is currently a student at [employer] and resides in a dorm with no steps to negotiate and +elevator access inside. At home, where the patient will be discharged, pt resides in a private house with parents, +1 step to enter, lives downstairs in basement. Reports that there is an elevator in his house as well. Was independent with all ADLs and functional mobility prior to admission.     Pt left semi-supine in bed, all lines intact, all needs in reach, in NAD. JOSE page.

## 2023-04-04 NOTE — DISCHARGE NOTE PROVIDER - NPI NUMBER (FOR SYSADMIN USE ONLY) :
Writer called pt. Pt reports Mikaela at home notified pt they do not carry these items. Pt [facility] Orders faxed. Pt advised order to physical therapy appears active still. Pt will call to schedule, if not will call office back. [[phone]]

## 2023-04-04 NOTE — DISCHARGE NOTE PROVIDER - HOSPITAL COURSE
17-year-old male history of Crohn's disease presents to the ED with back pain, found to have L5 and S1 disc herniation.       ·  Problem: Acute back pain.   ·  Plan: Patient with acute and worsening lumbar back pain, radiating down his L leg   -MR lumbar spine showed small left-sided disc herniation L5-S1 with extension into the   left neural  foramen and left lateral recess with mass effect on the left   L5 and S1 nerve roots  -Neurosurgery recs appreciated. No surgical intervention at this time  - Will discharge on Oxycodone prn, Neurontin 900mg qHS, Meloxicam prn and Medrol dose pack  - Out patient PT  - f/u with neurosurgery Dr Ha in 2 weeks     ·  Problem: HNP (herniated nucleus pulposus), lumbar.   ·  Plan: MR lumbar spine showed small left-sided disc herniation L5-S1 with extension into the   left neural  foramen and left lateral recess with mass effect on the left   L5 and S1 nerve roots  -neurosurgery recs appreciated   -pain management as above.    ·  Problem: Crohn's disease.   ·  Plan: Stable   -pt receives IV infusion as outpatient.

## 2023-04-04 NOTE — PHYSICAL THERAPY INITIAL EVALUATION ADULT - NSPTDISCHREC_GEN_A_CORE
Pt. not placed on skilled therapy services secondary to pt. performing at their baseline functional mobility performance. anticipate discharge to home with no skilled PT services./Outpatient PT

## 2023-04-04 NOTE — DISCHARGE NOTE PROVIDER - CARE PROVIDER_API CALL
Lacho Ha)  Neurosurgery  [address], [address]  [place] [address]  Phone: [phone]  Fax: [phone]  Follow Up Time:

## 2023-04-04 NOTE — PHYSICAL THERAPY INITIAL EVALUATION ADULT - GENERAL OBSERVATIONS, REHAB EVAL
Pt received semi-supine in bed, father present at bedside, in NAD. Pt agreeable to participate in PT evaluation.

## 2023-04-04 NOTE — PROGRESS NOTE ADULT - PROBLEM SELECTOR PLAN 1
Patient with acute and worsening lumbar back pain, radiating down his L leg   -MR lumbar spine showed small left-sided disc herniation L5-S1 with extension into the   left neural  foramen and left lateral recess with mass effect on the left   L5 and S1 nerve roots  -Neurosurgery recs appreciated. No surgical intervention   - Oxycodone prn, Meloxicam prn. Out patient PT   -C/w gabapentin 900 mg at bedtime and flexeril q8hrs.  - f/u with neurosurgery Dr. Ha

## 2023-04-12 ENCOUNTER — NON-APPOINTMENT (OUTPATIENT)
Age: 18
End: 2023-04-12

## 2023-04-12 ENCOUNTER — APPOINTMENT (OUTPATIENT)
Dept: NEUROSURGERY | Facility: CLINIC | Age: 18
End: 2023-04-12

## 2023-09-27 NOTE — DISCHARGE NOTE NURSING/CASE MANAGEMENT/SOCIAL WORK - MODE OF TRANSPORTATION
Ambulatory Procedure Scheduling Checklist    The surgery department will call you 1-2 days prior to your procedure to notify you what time to arrive.   For questions regarding your procedure times/instructions call [phone].     Insurance:  Payor: [employer] / Plan: NATIONALPOSOPENACCESS / Product Type: POS MISC    Procedure Date:    11/2/23 cervical epidural steroid injection    Surgery will cancel your procedure if you are more than 15 minutes late    Surgery location:    [address] [place] [address]    Pacemaker/Defibrillator:  No  Blood Thinner:  none     Diabetic:  No      Diet:    Nothing to eat or drink 6 hours prior to procedure    Medication:   Take only medication critical to your care (heart and Blood Pressure medication) with only a small sip of water the morning of your procedure.    Driving:  You will need a responsible  to bring you home after surgery.     Return to Clinic:   3 week follow up with SHIRA Rodriguez in the office.           Epidural Steroid Injection  General and Discharge Information      What is an epidural steroid injection?    This is a procedure done to help control pain or numbness from nerve irritation.      What causes nerve irritation?    There can be many causes, for example:  As we age, several parts of the back can break down, causing bulging or bone spurs. These can push into either the spinal cord (spinal stenosis) or into the opening where nerves leave the spine. This irritates the nerves, causing pain, numbness, weakness, or tingling.  The disc between the bones in the back may be \"bulging\" into the nerve area, due to injury or breaking down with age. Bone spurs due to arthritis can push into these areas.  Scar tissue and changes after neck or back surgery may irritate the nerves as well.      Where is the epidural space?:    It is located along the entire length of the spinal canal. The nerves pass through this space as  they exit the spine. Injecting steroid (cortisone) medicine into this space may help relieve irritation, soreness, and swelling of the nerves at and near this level.              Where is the injection given?:    An MRI, CT scan or your symptoms (where it hurts) will direct the doctor to the area causing pain. There are 3 places the injection may be given to you:    Along the middle of your spine  On either side of your spine  Up through your tailbone            How is the procedure done?:    It can be done as an outpatient procedure or in the hospital. Before the procedure, tell your doctor if you have any allergies or are taking any blood thinners.    You will lie on your abdomen on an x-ray table.  The area to be injected will be cleansed with an antiseptic solution, which will feel cold. Then the area will be numbed with local anesthetic (you'll feel a \"pin prick\" and slight burning).  Using x-ray guidance, the doctor will inject the steroid along with saline or local anesthetic, into the epidural space. You may feel some pressure, but there should not be pain.      After the procedure:    You will be observed for about 15 minutes in the recovery area. The affected arm or leg may feel temporary numbness, tingling, or warmth. You may want to bring a  with you the first time until you see how you react to the injection. Some doctors require a ; ask your doctor.    The steroid will take 24 to 48 hours to begin to work, with peak effect in 3 to 5 days. You and your doctor will decide if a second injection should be done, depending on how you feel. You may receive a total of 3 epidural injections over 6 to 12 months. We will go over discharge instructions before you leave.      Discharge instructions:    You were given a number of medicines during the procedure. These may include sedatives, local anesthetics, steroids, and other medicines. Any of these drugs or the procedure itself can cause side effects,  including drowsiness, temporary numbness, weakness, and soreness.    You may feel temporary numbness, weakness, or tingling:  In the neck, arm, or fingertips (if your procedure was done in the neck)  In the legs (if your procedure was done in your lower back)      What should I do when I get home?    Rest for a few hours as needed.  Walk with help as long as numbness, weakness, or drowsiness is present.  Resume activities as tolerated; do not overdo it.  Resume your regular diet.  Avoid driving until numbness and the weakness wears off.  Follow your doctor's instructions about going back to work. Often patients return to work the same or next day.      Other instructions:    Take your medicines as usual. If you stopped taking your blood-thinning medicine, check with your doctor about taking this again.  You may apply ice for 15 minutes on and 60 minutes off.  The injection may cause increased pain for 1 to 2 days. Use your pain medicines as usual.  The steroid medication may take 24 to 48 hours to start relieving pain. Peak pain relief occurs in 3 to 5 days.  You may remove the bandage after several hours.  You may bathe or shower the next day. A small bruise and tenderness at the injection site is normal for 1 to 2 days. Avoid hot tubs or whirlpool tubs with jet sprays for 72 hours.  If you are diabetic, your blood glucose may increase for several days. Call your primary doctor if the blood sugar levels concern you.      Call us if you have:    Excessive or abnormal bleeding, persistent chills, or fever over 100 degrees Farenheit.  A major change in the pattern or level of your pain.  A severe headache that does not go away with usual treatment methods.    In case of emergency, call your doctor. If you cannot reach a doctor, go to the nearest Emergency Room and ask them to call your doctor.

## 2024-03-11 ENCOUNTER — NON-APPOINTMENT (OUTPATIENT)
Age: 19
End: 2024-03-11

## 2024-03-11 ENCOUNTER — APPOINTMENT (OUTPATIENT)
Dept: OPHTHALMOLOGY | Facility: CLINIC | Age: 19
End: 2024-03-11
Payer: COMMERCIAL

## 2024-03-11 PROCEDURE — 99203 OFFICE O/P NEW LOW 30 MIN: CPT

## 2024-07-08 ENCOUNTER — APPOINTMENT (OUTPATIENT)
Dept: ORTHOPEDIC SURGERY | Facility: CLINIC | Age: 19
End: 2024-07-08

## 2024-08-02 ENCOUNTER — APPOINTMENT (OUTPATIENT)
Dept: MRI IMAGING | Facility: CLINIC | Age: 19
End: 2024-08-02

## 2024-08-02 ENCOUNTER — OUTPATIENT (OUTPATIENT)
Dept: OUTPATIENT SERVICES | Facility: HOSPITAL | Age: 19
LOS: 1 days | End: 2024-08-02

## 2024-08-02 PROCEDURE — 74183 MRI ABD W/O CNTR FLWD CNTR: CPT | Mod: 26

## 2024-08-02 PROCEDURE — 72197 MRI PELVIS W/O & W/DYE: CPT | Mod: 26

## 2024-12-31 ENCOUNTER — OUTPATIENT (OUTPATIENT)
Dept: OUTPATIENT SERVICES | Facility: HOSPITAL | Age: 19
LOS: 1 days | End: 2024-12-31
Payer: COMMERCIAL

## 2024-12-31 ENCOUNTER — APPOINTMENT (OUTPATIENT)
Dept: MRI IMAGING | Facility: CLINIC | Age: 19
End: 2024-12-31
Payer: COMMERCIAL

## 2024-12-31 DIAGNOSIS — Z00.00 ENCOUNTER FOR GENERAL ADULT MEDICAL EXAMINATION WITHOUT ABNORMAL FINDINGS: ICD-10-CM

## 2024-12-31 PROCEDURE — A9585: CPT

## 2024-12-31 PROCEDURE — 72197 MRI PELVIS W/O & W/DYE: CPT

## 2024-12-31 PROCEDURE — 72197 MRI PELVIS W/O & W/DYE: CPT | Mod: 26

## 2024-12-31 PROCEDURE — 74183 MRI ABD W/O CNTR FLWD CNTR: CPT | Mod: 26

## 2024-12-31 PROCEDURE — 74183 MRI ABD W/O CNTR FLWD CNTR: CPT

## 2025-01-06 ENCOUNTER — APPOINTMENT (OUTPATIENT)
Dept: COLORECTAL SURGERY | Facility: CLINIC | Age: 20
End: 2025-01-06
Payer: COMMERCIAL

## 2025-01-06 DIAGNOSIS — K50.90 CROHN'S DISEASE, UNSPECIFIED, W/OUT COMPLICATIONS: ICD-10-CM

## 2025-01-06 PROCEDURE — 99244 OFF/OP CNSLTJ NEW/EST MOD 40: CPT

## 2025-01-23 ENCOUNTER — APPOINTMENT (OUTPATIENT)
Dept: GASTROENTEROLOGY | Facility: CLINIC | Age: 20
End: 2025-01-23

## 2025-01-24 ENCOUNTER — APPOINTMENT (OUTPATIENT)
Dept: GASTROENTEROLOGY | Facility: CLINIC | Age: 20
End: 2025-01-24

## 2025-01-24 DIAGNOSIS — K50.90 CROHN'S DISEASE, UNSPECIFIED, W/OUT COMPLICATIONS: ICD-10-CM

## 2025-01-24 PROCEDURE — ZZZZZ: CPT

## 2025-01-30 ENCOUNTER — OUTPATIENT (OUTPATIENT)
Dept: OUTPATIENT SERVICES | Facility: HOSPITAL | Age: 20
LOS: 1 days | End: 2025-01-30
Payer: COMMERCIAL

## 2025-01-30 ENCOUNTER — APPOINTMENT (OUTPATIENT)
Dept: COLORECTAL SURGERY | Facility: HOSPITAL | Age: 20
End: 2025-01-30

## 2025-01-30 ENCOUNTER — TRANSCRIPTION ENCOUNTER (OUTPATIENT)
Age: 20
End: 2025-01-30

## 2025-01-30 VITALS
SYSTOLIC BLOOD PRESSURE: 124 MMHG | RESPIRATION RATE: 20 BRPM | HEIGHT: 74 IN | OXYGEN SATURATION: 96 % | TEMPERATURE: 98 F | DIASTOLIC BLOOD PRESSURE: 81 MMHG | HEART RATE: 76 BPM | WEIGHT: 184.09 LBS

## 2025-01-30 VITALS
DIASTOLIC BLOOD PRESSURE: 59 MMHG | RESPIRATION RATE: 15 BRPM | OXYGEN SATURATION: 100 % | HEART RATE: 65 BPM | SYSTOLIC BLOOD PRESSURE: 108 MMHG

## 2025-01-30 DIAGNOSIS — K50.90 CROHN'S DISEASE, UNSPECIFIED, WITHOUT COMPLICATIONS: ICD-10-CM

## 2025-01-30 DIAGNOSIS — Z98.890 OTHER SPECIFIED POSTPROCEDURAL STATES: Chronic | ICD-10-CM

## 2025-01-30 PROCEDURE — 45378 DIAGNOSTIC COLONOSCOPY: CPT

## 2025-01-30 RX ORDER — VEDOLIZUMAB 108 MG/.68ML
300 INJECTION, SOLUTION SUBCUTANEOUS
Refills: 0 | DISCHARGE

## 2025-01-30 RX ORDER — FENTANYL CITRATE 50 UG/ML
25 INJECTION INTRAMUSCULAR; INTRAVENOUS
Refills: 0 | Status: DISCONTINUED | OUTPATIENT
Start: 2025-01-30 | End: 2025-01-30

## 2025-01-30 RX ORDER — BACTERIOSTATIC SODIUM CHLORIDE 0.9 %
3 VIAL (ML) INJECTION EVERY 8 HOURS
Refills: 0 | Status: DISCONTINUED | OUTPATIENT
Start: 2025-01-30 | End: 2025-01-30

## 2025-01-30 RX ORDER — ONDANSETRON 4 MG/1
4 TABLET, ORALLY DISINTEGRATING ORAL ONCE
Refills: 0 | Status: DISCONTINUED | OUTPATIENT
Start: 2025-01-30 | End: 2025-01-30

## 2025-01-30 RX ORDER — SODIUM CHLORIDE 9 G/ML
1000 INJECTION, SOLUTION INTRAVENOUS
Refills: 0 | Status: DISCONTINUED | OUTPATIENT
Start: 2025-01-30 | End: 2025-01-30

## 2025-01-30 RX ORDER — FENTANYL CITRATE 50 UG/ML
50 INJECTION INTRAMUSCULAR; INTRAVENOUS
Refills: 0 | Status: DISCONTINUED | OUTPATIENT
Start: 2025-01-30 | End: 2025-01-30

## 2025-01-30 RX ADMIN — SODIUM CHLORIDE 75 MILLILITER(S): 9 INJECTION, SOLUTION INTRAVENOUS at 13:18

## 2025-01-30 NOTE — ASU PATIENT PROFILE, ADULT - AS SC BRADEN ACTIVITY
Looks like she did have a stone on the right side   Also incidentally there is a cyst in the liver   Reviewed urine - no infection (4) walks frequently

## 2025-01-30 NOTE — BRIEF OPERATIVE NOTE - OPERATION/FINDINGS
Exam under anesthesia. Skin tag at anal opening. Colonoscopy demonstrating healthy colonic mucosa. Large, side-to-side patchless anastomosis noted, bloating and distension possibly due to size of anastomotic connection.

## 2025-01-30 NOTE — H&P PST ADULT - ASSESSMENT
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 His most recent MRE was negative for any active small bowel disease. This is wonderful. At this stage we spent a good amount of time and I do believe that he will need initial exam under anesthesia and colonoscopy if needed he may get an EGD in the long run but specifically from my part I had this MRE looked at by another radiologist he does not feel the necessity of repeating the MR E and he feels the quality is pretty good. At this stage we will have the establish her care with our senior IBD gastroenterologist Dr. Noé Thapa and also do a exam under anesthesia and colonoscopy and assess the things and proceed accordingly. Sometimes the type of the anastomosis can play a role and I will try to assess it is much as possible and take 1 step at a time. Spent 30 minutes time in the presence of his mother and he is eager to proceed when he is coming for another set date in late January.

## 2025-01-30 NOTE — PRE-ANESTHESIA EVALUATION ADULT - NSANTHPMHFT_GEN_ALL_CORE
20 yo male with hx of stricturing and penetrating ileocolonic and perianal crohn's disease.  He had an ileocolic resection in 2014. 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  ?  Recently having more bloating, flatulence with foul smell since 9/2024 and a more blood in stools.  He is very fatigued, post-prandial bloating. The skin tag is really uncomfortable and bothering him.  He had a herniated disc and had spinal surgery from \[facility]\""  He used to be a  - not exercising the way he was.  Past Medications include - Infliximab, Adalimumab, Methotrexate, Mesalamine and Vedolizumab.

## 2025-01-30 NOTE — ASU PATIENT PROFILE, ADULT - FALL HARM RISK - UNIVERSAL INTERVENTIONS
Bed in lowest position, wheels locked, appropriate side rails in place/Call bell, personal items and telephone in reach/Instruct patient to call for assistance before getting out of bed or chair/Non-slip footwear when patient is out of bed/[place] to call system/Physically safe environment - no spills, clutter or unnecessary equipment/Purposeful Proactive Rounding/Room/bathroom lighting operational, light cord in reach

## 2025-01-30 NOTE — ASU DISCHARGE PLAN (ADULT/PEDIATRIC) - FINANCIAL ASSISTANCE
[facility] provides services at a reduced cost to those who are determined to be eligible through [facility]’s financial assistance program. Information regarding [facility]’s financial assistance program can be found by going to [email or web address] or by calling [phone].

## 2025-01-30 NOTE — BRIEF OPERATIVE NOTE - NSICDXBRIEFPROCEDURE_GEN_ALL_CORE_FT
PROCEDURES:  Exam under anesthesia, anus 30-Jan-2025 12:12:16  Lemuel Montague  Colonoscopy 30-Jan-2025 12:12:24  Lemuel Montague

## 2025-01-30 NOTE — H&P PST ADULT - HISTORY OF PRESENT ILLNESS
17-year-old male history of Crohn's disease presents to the ED with back pain. The pain is localized on the left side, radiating down his left leg. It worsens with movement and improves with IV morphine. It started on Wednesday. No triggering event as per the patient. He denies any fall, trauma, or car accidents. He reports that he has been lifting heavy and working out 2-3 times a day for the last one month. He denies any recent fever, chills, bowel or urinary incontinence. He went to an OSH on Wednesday and underwent Xray and US which were reportedly normal. He was discharged with percocet and flexeril which did not improve his pain.   In the ED, his vitals were within acceptable range. Labs were ordered.      01/06/2025  18 yo male with hx of stricturing and penetrating ileocolonic and perianal crohn's disease.  He had an ileocolic resection in 2014. 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.  ?  Recently having more bloating, flatulence with foul smell since 9/2024 and a more blood in stools.  He is very fatigued, post-prandial bloating. The skin tag is really uncomfortable and bothering him.  He had a herniated disc and had spinal surgery from [facility]  He used to be a  - not exercising the way he was.  Past Medications include - Infliximab, Adalimumab, Methotrexate, Mesalamine and Vedolizumab.  ?  Pathology Surgery 11/26/2014  Terminal Ileum, Appendix and Cecum - chronic active ileitis with erosions and strictures c/w crohn's disease. Segment of colon and vermiform appendix with no pathologic dx. Twenty reactive lymph nodes. Note: No granulomas or dysplasia identified. The margins are uninvolved by disease.  ?  Colonoscopy 8/10/2023 (pg 18 of 25)  The colon appeared normal except for a few scattered small erythematous spots and a small single erosion at the ileum-colon anastomosis site.  (pathology pg 18)  ?  He tried xifaxin - and he wasn't compliant and it didn't help.  His biggest issue is bloating. Next is fatigue/mental fog

## 2025-01-30 NOTE — H&P PST ADULT - ATTENDING COMMENTS
Patient seen and examined. No change from H&P. All risks and benefits explained to the patient and the patient consents to the procedure.    Sania Cook

## 2025-01-31 RX ORDER — RIFAXIMIN 550 MG/1
550 TABLET ORAL
Qty: 42 | Refills: 1 | Status: ACTIVE | COMMUNITY
Start: 2025-01-31 | End: 1900-01-01

## 2025-03-19 NOTE — BRIEF OPERATIVE NOTE - TYPE OF ANESTHESIA
Medication: amlodipine  Last office visit date: 7/16/2024  Medication Refill Protocol Failed.  Failed criteria: see below. Sent to clinician to review.   Calcium Channel Blockers Refill Protocol - 12 Month Protocol [id number]/19/2025 05:29 AM   Protocol Details Last BP within the last 12 months was equal to or less than 150/100 -- IF CRITERIA FAILED REFER TO PROTOCOL DETAILS       158/88 Abnormal   as of 3/4/2025      MAC

## 2025-06-26 ENCOUNTER — APPOINTMENT (OUTPATIENT)
Dept: PSYCHIATRY | Facility: CLINIC | Age: 20
End: 2025-06-26
Payer: COMMERCIAL

## 2025-06-26 PROCEDURE — 90791 PSYCH DIAGNOSTIC EVALUATION: CPT

## 2025-07-02 ENCOUNTER — APPOINTMENT (OUTPATIENT)
Dept: PSYCHIATRY | Facility: CLINIC | Age: 20
End: 2025-07-02

## 2025-07-10 ENCOUNTER — APPOINTMENT (OUTPATIENT)
Dept: PSYCHIATRY | Facility: CLINIC | Age: 20
End: 2025-07-10

## 2025-07-17 ENCOUNTER — APPOINTMENT (OUTPATIENT)
Dept: PSYCHIATRY | Facility: CLINIC | Age: 20
End: 2025-07-17

## (undated) DEVICE — DRAPE LEGGINGS XL

## (undated) DEVICE — GLV 8 PROTEXIS (WHITE)

## (undated) DEVICE — WARMING BLANKET UPPER ADULT

## (undated) DEVICE — DRAPE TOWEL BLUE 17" X 24"

## (undated) DEVICE — ELCTR BOVIE PENCIL HANDPIECE ROCKER SWITCH 15FT

## (undated) DEVICE — PACK LITHOTOMY

## (undated) DEVICE — SOL IRR POUR NS 0.9% 500ML

## (undated) DEVICE — TUBING CAP SET ERBEFLO CLEVERCAP HYBRID CO2 FOR OLYMPUS SCOPES AND UCR

## (undated) DEVICE — POSITIONER FOAM EGG CRATE ULNAR 2PCS (PINK)

## (undated) DEVICE — SUCTION YANKAUER OPEN TIP NO VENT CURVE

## (undated) DEVICE — Device

## (undated) DEVICE — VENODYNE/SCD SLEEVE CALF MEDIUM

## (undated) DEVICE — GOWN TRIMAX XXL

## (undated) DEVICE — SOL IRR POUR H2O 250ML